# Patient Record
Sex: FEMALE | URBAN - METROPOLITAN AREA
[De-identification: names, ages, dates, MRNs, and addresses within clinical notes are randomized per-mention and may not be internally consistent; named-entity substitution may affect disease eponyms.]

---

## 2022-09-28 ENCOUNTER — OFFICE VISIT (OUTPATIENT)
Dept: URBAN - METROPOLITAN AREA CLINIC 26 | Facility: CLINIC | Age: 72
End: 2022-09-28
Payer: MEDICARE

## 2022-09-28 DIAGNOSIS — H54.40 BLINDNESS, ONE EYE, UNSPECIFIED EYE: ICD-10-CM

## 2022-09-28 DIAGNOSIS — H16.221 KERATOCONJUNCTIVITIS SICCA OF RIGHT EYE: Primary | ICD-10-CM

## 2022-09-28 DIAGNOSIS — H43.391 OTHER VITREOUS OPACITIES, RIGHT EYE: ICD-10-CM

## 2022-09-28 DIAGNOSIS — H52.4 PRESBYOPIA: ICD-10-CM

## 2022-09-28 DIAGNOSIS — Z96.1 PRESENCE OF INTRAOCULAR LENS: ICD-10-CM

## 2022-09-28 PROCEDURE — 92004 COMPRE OPH EXAM NEW PT 1/>: CPT | Performed by: OPTOMETRIST

## 2022-09-28 PROCEDURE — 92134 CPTRZ OPH DX IMG PST SGM RTA: CPT | Performed by: OPTOMETRIST

## 2022-09-28 ASSESSMENT — VISUAL ACUITY: OD: 20/25

## 2022-09-28 ASSESSMENT — KERATOMETRY: OD: 42.88

## 2022-09-28 ASSESSMENT — INTRAOCULAR PRESSURE: OD: 18

## 2022-09-28 NOTE — IMPRESSION/PLAN
Impression: Presbyopia: H52.4. Plan: Recommend glasses for optimal vision. Monocular precautions reviewed.

## 2022-09-28 NOTE — IMPRESSION/PLAN
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 Plan: unclear hx. longstanding. hx of vision loss OS. s/p enucleation OS. prosthesis present OS.

## 2022-09-28 NOTE — IMPRESSION/PLAN
Impression: Keratoconjunctivitis sicca of right eye: H16.221. Plan: Recommend artificial tears at least 4 times a day and gel drop or tear ointment at bedtime, Omega 3 fatty acids (2-3,000 mg) daily. Avoid overhead fans at bedtime.

## 2022-09-28 NOTE — IMPRESSION/PLAN
Impression: Other vitreous opacities, right eye: H43.391. Plan: mac oct ordered and performed today. No retinal pathology. No family history, prior peripheral retinal disease, or other risk factors evident. Warning signs of retinal tear and detachment discussed with patient. To return to clinic STAT if any change in symptoms consistent with RT or RD.

## 2023-10-31 ENCOUNTER — OFFICE VISIT (OUTPATIENT)
Dept: URBAN - METROPOLITAN AREA CLINIC 26 | Facility: CLINIC | Age: 73
End: 2023-10-31
Payer: MEDICARE

## 2023-10-31 DIAGNOSIS — H54.40 BLINDNESS, ONE EYE, UNSPECIFIED EYE: ICD-10-CM

## 2023-10-31 DIAGNOSIS — H16.221 KERATOCONJUNCTIVITIS SICCA OF RIGHT EYE: ICD-10-CM

## 2023-10-31 DIAGNOSIS — H43.391 OTHER VITREOUS OPACITIES, RIGHT EYE: Primary | ICD-10-CM

## 2023-10-31 DIAGNOSIS — H52.4 PRESBYOPIA: ICD-10-CM

## 2023-10-31 PROCEDURE — 92014 COMPRE OPH EXAM EST PT 1/>: CPT | Performed by: OPTOMETRIST

## 2023-10-31 PROCEDURE — 92134 CPTRZ OPH DX IMG PST SGM RTA: CPT | Performed by: OPTOMETRIST

## 2023-10-31 ASSESSMENT — INTRAOCULAR PRESSURE: OD: 18

## 2023-10-31 ASSESSMENT — KERATOMETRY: OD: 43.00

## 2023-10-31 ASSESSMENT — VISUAL ACUITY: OD: 20/20

## 2024-11-04 ENCOUNTER — OFFICE VISIT (OUTPATIENT)
Dept: URBAN - METROPOLITAN AREA CLINIC 26 | Facility: CLINIC | Age: 74
End: 2024-11-04
Payer: MEDICARE

## 2024-11-04 DIAGNOSIS — H54.40 BLINDNESS, ONE EYE, UNSPECIFIED EYE: ICD-10-CM

## 2024-11-04 DIAGNOSIS — H52.4 PRESBYOPIA: ICD-10-CM

## 2024-11-04 DIAGNOSIS — H43.391 OTHER VITREOUS OPACITIES, RIGHT EYE: ICD-10-CM

## 2024-11-04 DIAGNOSIS — H16.221 KERATOCONJUNCTIVITIS SICCA OF RIGHT EYE: Primary | ICD-10-CM

## 2024-11-04 DIAGNOSIS — H01.006 BLEPHARITIS OF LEFT EYELID: ICD-10-CM

## 2024-11-04 PROCEDURE — 92014 COMPRE OPH EXAM EST PT 1/>: CPT | Performed by: OPTOMETRIST

## 2024-11-04 PROCEDURE — 92134 CPTRZ OPH DX IMG PST SGM RTA: CPT | Performed by: OPTOMETRIST

## 2024-11-04 ASSESSMENT — VISUAL ACUITY: OD: 20/20

## 2024-11-04 ASSESSMENT — INTRAOCULAR PRESSURE: OD: 18

## 2024-11-04 ASSESSMENT — KERATOMETRY: OD: 43.00
